# Patient Record
Sex: MALE | URBAN - NONMETROPOLITAN AREA
[De-identification: names, ages, dates, MRNs, and addresses within clinical notes are randomized per-mention and may not be internally consistent; named-entity substitution may affect disease eponyms.]

---

## 2020-07-07 ENCOUNTER — NEW PATIENT (OUTPATIENT)
Dept: URBAN - NONMETROPOLITAN AREA CLINIC 3 | Facility: CLINIC | Age: 70
End: 2020-07-07
Payer: COMMERCIAL

## 2020-07-07 DIAGNOSIS — H46.9 UNSPECIFIED OPTIC NEURITIS: ICD-10-CM

## 2020-07-07 PROCEDURE — 92133 CPTRZD OPH DX IMG PST SGM ON: CPT | Performed by: OPTOMETRIST

## 2020-07-07 PROCEDURE — 92004 COMPRE OPH EXAM NEW PT 1/>: CPT | Performed by: OPTOMETRIST

## 2020-07-07 ASSESSMENT — VISUAL ACUITY
OS: 20/50
OD: 20/25

## 2020-07-07 ASSESSMENT — INTRAOCULAR PRESSURE
OS: 16
OD: 18

## 2020-07-07 ASSESSMENT — KERATOMETRY
OS: 255.38
OD: 47.63

## 2020-08-24 ENCOUNTER — FOLLOW UP ESTABLISHED (OUTPATIENT)
Dept: URBAN - NONMETROPOLITAN AREA CLINIC 3 | Facility: CLINIC | Age: 70
End: 2020-08-24
Payer: COMMERCIAL

## 2020-08-24 DIAGNOSIS — H49.20 SIXTH [ABDUCENT] NERVE PALSY, UNSPECIFIED EYE: Primary | ICD-10-CM

## 2020-08-24 DIAGNOSIS — M31.6 OTHER GIANT CELL ARTERITIS: ICD-10-CM

## 2020-08-24 PROCEDURE — 92012 INTRM OPH EXAM EST PATIENT: CPT | Performed by: OPTOMETRIST

## 2020-08-24 ASSESSMENT — INTRAOCULAR PRESSURE
OS: 17
OD: 18

## 2021-02-02 ENCOUNTER — FOLLOW UP ESTABLISHED (OUTPATIENT)
Dept: URBAN - NONMETROPOLITAN AREA CLINIC 3 | Facility: CLINIC | Age: 71
End: 2021-02-02
Payer: COMMERCIAL

## 2021-02-02 DIAGNOSIS — H52.4 PRESBYOPIA: Primary | ICD-10-CM

## 2021-02-02 PROCEDURE — 92014 COMPRE OPH EXAM EST PT 1/>: CPT | Performed by: OPTOMETRIST

## 2021-02-02 ASSESSMENT — INTRAOCULAR PRESSURE
OS: 11
OD: 10

## 2021-02-02 ASSESSMENT — VISUAL ACUITY
OD: 20/20
OS: 20/30

## 2021-07-01 ENCOUNTER — OFFICE VISIT (OUTPATIENT)
Dept: URBAN - NONMETROPOLITAN AREA CLINIC 3 | Facility: CLINIC | Age: 71
End: 2021-07-01
Payer: COMMERCIAL

## 2021-07-01 DIAGNOSIS — Z96.1 PRESENCE OF PSEUDOPHAKIA: ICD-10-CM

## 2021-07-01 PROCEDURE — 99212 OFFICE O/P EST SF 10 MIN: CPT | Performed by: OPTOMETRIST

## 2021-07-01 ASSESSMENT — INTRAOCULAR PRESSURE
OS: 15
OD: 18

## 2021-07-01 ASSESSMENT — VISUAL ACUITY
OS: 20/50
OD: 20/30

## 2021-07-01 NOTE — IMPRESSION/PLAN
Impression: Unspecified optic neuritis OS Plan: BY  HX: Secondary  to DX of GCA. Pt treated with steroid by PCP. Discussed findings with pt. Discussed permanent ON damage.  Monitor 6 months

## 2021-07-01 NOTE — IMPRESSION/PLAN
Impression: Presence of pseudophakia: Z96.1. Plan: Good  surgical  outcome. Discussed diagnosis in detail with patient. New glasses Rx was  NOT given today at patients request. Discussed need for re-evaluation with patient. Patient understands and accepts.